# Patient Record
Sex: MALE | Race: WHITE | NOT HISPANIC OR LATINO
[De-identification: names, ages, dates, MRNs, and addresses within clinical notes are randomized per-mention and may not be internally consistent; named-entity substitution may affect disease eponyms.]

---

## 2017-09-12 ENCOUNTER — RX ONLY (OUTPATIENT)
Age: 22
Setting detail: RX ONLY
End: 2017-09-12

## 2017-09-12 RX ORDER — ALUMINUM CHLORIDE 20 %
SOLUTION, NON-ORAL TOPICAL
Qty: 1 | Refills: 0 | Status: ERX

## 2018-05-30 ENCOUNTER — APPOINTMENT (RX ONLY)
Dept: URBAN - METROPOLITAN AREA CLINIC 141 | Facility: CLINIC | Age: 23
Setting detail: DERMATOLOGY
End: 2018-05-30

## 2018-05-30 DIAGNOSIS — L30.9 DERMATITIS, UNSPECIFIED: ICD-10-CM

## 2018-05-30 DIAGNOSIS — L74.51 PRIMARY FOCAL HYPERHIDROSIS: ICD-10-CM | Status: WORSENING

## 2018-05-30 PROBLEM — L74.512 PRIMARY FOCAL HYPERHIDROSIS, PALMS: Status: ACTIVE | Noted: 2018-05-30

## 2018-05-30 PROCEDURE — ? PRESCRIPTION

## 2018-05-30 PROCEDURE — ? FOLLOW UP FOR NEXT VISIT

## 2018-05-30 PROCEDURE — ? COUNSELING

## 2018-05-30 PROCEDURE — ? TREATMENT REGIMEN

## 2018-05-30 PROCEDURE — 99213 OFFICE O/P EST LOW 20 MIN: CPT

## 2018-05-30 RX ORDER — ALUMINUM CHLORIDE 20 %
SOLUTION, NON-ORAL TOPICAL QHS
Qty: 1 | Refills: 3 | Status: ERX

## 2018-05-30 RX ORDER — GLYCOPYRROLATE 1 MG/1
TABLET ORAL BID
Qty: 60 | Refills: 2 | Status: ERX

## 2018-05-30 ASSESSMENT — LOCATION DETAILED DESCRIPTION DERM
LOCATION DETAILED: RIGHT INFERIOR MEDIAL FOREHEAD
LOCATION DETAILED: RIGHT THENAR EMINENCE
LOCATION DETAILED: LEFT RADIAL PALM
LOCATION DETAILED: GLABELLA

## 2018-05-30 ASSESSMENT — LOCATION SIMPLE DESCRIPTION DERM
LOCATION SIMPLE: RIGHT HAND
LOCATION SIMPLE: RIGHT FOREHEAD
LOCATION SIMPLE: GLABELLA
LOCATION SIMPLE: LEFT HAND

## 2018-05-30 ASSESSMENT — LOCATION ZONE DERM
LOCATION ZONE: HAND
LOCATION ZONE: FACE

## 2018-05-30 NOTE — PROCEDURE: MIPS QUALITY
Quality 111:Pneumonia Vaccination Status For Older Adults: Pneumococcal Vaccination not Administered or Previously Received, Reason not Otherwise Specified
Quality 155 (Denominator): Falls Plan Of Care: Plan of Care not Documented, Reason not Otherwise Specified
Quality 154 Part B: Falls: Risk Screening (Should Be Reported With Measure 155.): Patient screened for future fall risk; documentation of no falls in the past year or only one fall without injury in the past year
Quality 226: Preventive Care And Screening: Tobacco Use: Screening And Cessation Intervention: Patient screened for tobacco and never smoked
Quality 131: Pain Assessment And Follow-Up: Pain assessment using a standardized tool is documented as negative, no follow-up plan required
Detail Level: Detailed
Quality 431: Preventive Care And Screening: Unhealthy Alcohol Use - Screening: Patient screened for unhealthy alcohol use using a single question and scores less than 2 times per year
Quality 154 Part A: Falls: Risk Assessment (Should Be Reported With Measure 155.): Falls risk assessment completed and documented in the past 12 months.
Quality 110: Preventive Care And Screening: Influenza Immunization: Influenza Immunization Ordered or Recommended, but not Administered due to system reason
Quality 47: Advance Care Plan: Advance Care Planning discussed and documented in the medical record; patient did not wish or was not able to name a surrogate decision maker or provide an advance care plan.